# Patient Record
Sex: MALE | Race: WHITE | URBAN - METROPOLITAN AREA
[De-identification: names, ages, dates, MRNs, and addresses within clinical notes are randomized per-mention and may not be internally consistent; named-entity substitution may affect disease eponyms.]

---

## 2017-11-17 RX ORDER — CYCLOBENZAPRINE HCL 5 MG
5 TABLET ORAL 3 TIMES DAILY PRN
COMMUNITY

## 2017-11-20 ENCOUNTER — APPOINTMENT (OUTPATIENT)
Dept: GENERAL RADIOLOGY | Facility: CLINIC | Age: 62
End: 2017-11-20
Attending: SPECIALIST
Payer: COMMERCIAL

## 2017-11-20 ENCOUNTER — ANESTHESIA EVENT (OUTPATIENT)
Dept: SURGERY | Facility: CLINIC | Age: 62
End: 2017-11-20
Payer: COMMERCIAL

## 2017-11-20 ENCOUNTER — ANESTHESIA (OUTPATIENT)
Dept: SURGERY | Facility: CLINIC | Age: 62
End: 2017-11-20
Payer: COMMERCIAL

## 2017-11-20 ENCOUNTER — HOSPITAL ENCOUNTER (OUTPATIENT)
Facility: CLINIC | Age: 62
Discharge: HOME | End: 2017-11-20
Attending: SPECIALIST | Admitting: SPECIALIST
Payer: COMMERCIAL

## 2017-11-20 ENCOUNTER — HOSPITAL ENCOUNTER (OUTPATIENT)
Dept: NUCLEAR MEDICINE | Facility: CLINIC | Age: 62
Setting detail: NUCLEAR MEDICINE
End: 2017-11-20
Attending: SPECIALIST
Payer: COMMERCIAL

## 2017-11-20 VITALS
HEIGHT: 69 IN | RESPIRATION RATE: 16 BRPM | SYSTOLIC BLOOD PRESSURE: 121 MMHG | TEMPERATURE: 97.8 F | BODY MASS INDEX: 26.07 KG/M2 | WEIGHT: 176 LBS | DIASTOLIC BLOOD PRESSURE: 63 MMHG | OXYGEN SATURATION: 97 %

## 2017-11-20 DIAGNOSIS — C79.40: Primary | ICD-10-CM

## 2017-11-20 DIAGNOSIS — C61: Primary | ICD-10-CM

## 2017-11-20 DIAGNOSIS — C61 PROSTATE CANCER (H): ICD-10-CM

## 2017-11-20 PROCEDURE — 40000277 XR SURGERY CARM FLUORO LESS THAN 5 MIN W STILLS

## 2017-11-20 PROCEDURE — 36000052 ZZH SURGERY LEVEL 2 EA 15 ADDTL MIN: Performed by: SPECIALIST

## 2017-11-20 PROCEDURE — 25000128 H RX IP 250 OP 636: Performed by: ANESTHESIOLOGY

## 2017-11-20 PROCEDURE — 25000128 H RX IP 250 OP 636: Performed by: SPECIALIST

## 2017-11-20 PROCEDURE — 71000027 ZZH RECOVERY PHASE 2 EACH 15 MINS: Performed by: SPECIALIST

## 2017-11-20 PROCEDURE — C1728 CATH, BRACHYTX SEED ADM: HCPCS | Performed by: SPECIALIST

## 2017-11-20 PROCEDURE — 25500064 ZZH RX 255 OP 636: Performed by: SPECIALIST

## 2017-11-20 PROCEDURE — 76965 ECHO GUIDANCE RADIOTHERAPY: CPT

## 2017-11-20 PROCEDURE — 71000012 ZZH RECOVERY PHASE 1 LEVEL 1 FIRST HR: Performed by: SPECIALIST

## 2017-11-20 PROCEDURE — 37000009 ZZH ANESTHESIA TECHNICAL FEE, EACH ADDTL 15 MIN: Performed by: SPECIALIST

## 2017-11-20 PROCEDURE — 40000170 ZZH STATISTIC PRE-PROCEDURE ASSESSMENT II: Performed by: SPECIALIST

## 2017-11-20 PROCEDURE — 27210794 ZZH OR GENERAL SUPPLY STERILE: Performed by: SPECIALIST

## 2017-11-20 PROCEDURE — 36000054 ZZH SURGERY LEVEL 2 W FLUORO 1ST 30 MIN: Performed by: SPECIALIST

## 2017-11-20 PROCEDURE — 25000125 ZZHC RX 250: Performed by: NURSE ANESTHETIST, CERTIFIED REGISTERED

## 2017-11-20 PROCEDURE — 37000008 ZZH ANESTHESIA TECHNICAL FEE, 1ST 30 MIN: Performed by: SPECIALIST

## 2017-11-20 PROCEDURE — 76499 UNLISTED DX RADIOGRAPHIC PX: CPT

## 2017-11-20 PROCEDURE — 25000566 ZZH SEVOFLURANE, EA 15 MIN: Performed by: SPECIALIST

## 2017-11-20 PROCEDURE — 25000128 H RX IP 250 OP 636: Performed by: NURSE ANESTHETIST, CERTIFIED REGISTERED

## 2017-11-20 PROCEDURE — 25000132 ZZH RX MED GY IP 250 OP 250 PS 637: Performed by: SPECIALIST

## 2017-11-20 RX ORDER — FENTANYL CITRATE 50 UG/ML
25-50 INJECTION, SOLUTION INTRAMUSCULAR; INTRAVENOUS EVERY 5 MIN PRN
Status: DISCONTINUED | OUTPATIENT
Start: 2017-11-20 | End: 2017-11-20 | Stop reason: HOSPADM

## 2017-11-20 RX ORDER — ONDANSETRON 2 MG/ML
INJECTION INTRAMUSCULAR; INTRAVENOUS PRN
Status: DISCONTINUED | OUTPATIENT
Start: 2017-11-20 | End: 2017-11-20

## 2017-11-20 RX ORDER — PROPOFOL 10 MG/ML
INJECTION, EMULSION INTRAVENOUS CONTINUOUS PRN
Status: DISCONTINUED | OUTPATIENT
Start: 2017-11-20 | End: 2017-11-20

## 2017-11-20 RX ORDER — HYDROCODONE BITARTRATE AND ACETAMINOPHEN 5; 325 MG/1; MG/1
1 TABLET ORAL EVERY 4 HOURS PRN
Qty: 20 TABLET | Refills: 0 | Status: SHIPPED | OUTPATIENT
Start: 2017-11-20

## 2017-11-20 RX ORDER — FENTANYL CITRATE 50 UG/ML
INJECTION, SOLUTION INTRAMUSCULAR; INTRAVENOUS PRN
Status: DISCONTINUED | OUTPATIENT
Start: 2017-11-20 | End: 2017-11-20

## 2017-11-20 RX ORDER — PROPOFOL 10 MG/ML
INJECTION, EMULSION INTRAVENOUS PRN
Status: DISCONTINUED | OUTPATIENT
Start: 2017-11-20 | End: 2017-11-20

## 2017-11-20 RX ORDER — DEXAMETHASONE SODIUM PHOSPHATE 4 MG/ML
INJECTION, SOLUTION INTRA-ARTICULAR; INTRALESIONAL; INTRAMUSCULAR; INTRAVENOUS; SOFT TISSUE PRN
Status: DISCONTINUED | OUTPATIENT
Start: 2017-11-20 | End: 2017-11-20

## 2017-11-20 RX ORDER — MEPERIDINE HYDROCHLORIDE 25 MG/ML
12.5 INJECTION INTRAMUSCULAR; INTRAVENOUS; SUBCUTANEOUS
Status: DISCONTINUED | OUTPATIENT
Start: 2017-11-20 | End: 2017-11-20 | Stop reason: HOSPADM

## 2017-11-20 RX ORDER — CEFAZOLIN SODIUM 2 G/100ML
2 INJECTION, SOLUTION INTRAVENOUS
Status: COMPLETED | OUTPATIENT
Start: 2017-11-20 | End: 2017-11-20

## 2017-11-20 RX ORDER — SODIUM CHLORIDE, SODIUM LACTATE, POTASSIUM CHLORIDE, CALCIUM CHLORIDE 600; 310; 30; 20 MG/100ML; MG/100ML; MG/100ML; MG/100ML
INJECTION, SOLUTION INTRAVENOUS CONTINUOUS
Status: DISCONTINUED | OUTPATIENT
Start: 2017-11-20 | End: 2017-11-20 | Stop reason: HOSPADM

## 2017-11-20 RX ORDER — NALOXONE HYDROCHLORIDE 0.4 MG/ML
.1-.4 INJECTION, SOLUTION INTRAMUSCULAR; INTRAVENOUS; SUBCUTANEOUS
Status: DISCONTINUED | OUTPATIENT
Start: 2017-11-20 | End: 2017-11-20 | Stop reason: HOSPADM

## 2017-11-20 RX ORDER — EPHEDRINE SULFATE 50 MG/ML
INJECTION, SOLUTION INTRAMUSCULAR; INTRAVENOUS; SUBCUTANEOUS PRN
Status: DISCONTINUED | OUTPATIENT
Start: 2017-11-20 | End: 2017-11-20

## 2017-11-20 RX ORDER — HYDROCODONE BITARTRATE AND ACETAMINOPHEN 5; 325 MG/1; MG/1
1 TABLET ORAL EVERY 6 HOURS PRN
Status: DISCONTINUED | OUTPATIENT
Start: 2017-11-20 | End: 2017-11-20 | Stop reason: HOSPADM

## 2017-11-20 RX ORDER — LIDOCAINE HYDROCHLORIDE 20 MG/ML
INJECTION, SOLUTION INFILTRATION; PERINEURAL PRN
Status: DISCONTINUED | OUTPATIENT
Start: 2017-11-20 | End: 2017-11-20

## 2017-11-20 RX ORDER — ONDANSETRON 2 MG/ML
4 INJECTION INTRAMUSCULAR; INTRAVENOUS EVERY 30 MIN PRN
Status: DISCONTINUED | OUTPATIENT
Start: 2017-11-20 | End: 2017-11-20 | Stop reason: HOSPADM

## 2017-11-20 RX ORDER — HYDROCODONE BITARTRATE AND ACETAMINOPHEN 5; 325 MG/1; MG/1
1-2 TABLET ORAL ONCE
Status: COMPLETED | OUTPATIENT
Start: 2017-11-20 | End: 2017-11-20

## 2017-11-20 RX ORDER — FENTANYL CITRATE 50 UG/ML
25-50 INJECTION, SOLUTION INTRAMUSCULAR; INTRAVENOUS
Status: DISCONTINUED | OUTPATIENT
Start: 2017-11-20 | End: 2017-11-20 | Stop reason: HOSPADM

## 2017-11-20 RX ORDER — ONDANSETRON 4 MG/1
4 TABLET, ORALLY DISINTEGRATING ORAL EVERY 30 MIN PRN
Status: DISCONTINUED | OUTPATIENT
Start: 2017-11-20 | End: 2017-11-20 | Stop reason: HOSPADM

## 2017-11-20 RX ORDER — SODIUM CHLORIDE, SODIUM LACTATE, POTASSIUM CHLORIDE, CALCIUM CHLORIDE 600; 310; 30; 20 MG/100ML; MG/100ML; MG/100ML; MG/100ML
INJECTION, SOLUTION INTRAVENOUS CONTINUOUS PRN
Status: DISCONTINUED | OUTPATIENT
Start: 2017-11-20 | End: 2017-11-20

## 2017-11-20 RX ORDER — GLYCOPYRROLATE 0.2 MG/ML
INJECTION, SOLUTION INTRAMUSCULAR; INTRAVENOUS PRN
Status: DISCONTINUED | OUTPATIENT
Start: 2017-11-20 | End: 2017-11-20

## 2017-11-20 RX ORDER — HYDROMORPHONE HYDROCHLORIDE 1 MG/ML
.3-.5 INJECTION, SOLUTION INTRAMUSCULAR; INTRAVENOUS; SUBCUTANEOUS EVERY 10 MIN PRN
Status: DISCONTINUED | OUTPATIENT
Start: 2017-11-20 | End: 2017-11-20 | Stop reason: HOSPADM

## 2017-11-20 RX ORDER — CIPROFLOXACIN 500 MG/1
500 TABLET, FILM COATED ORAL 2 TIMES DAILY
Qty: 14 TABLET | Refills: 0 | Status: SHIPPED | OUTPATIENT
Start: 2017-11-20

## 2017-11-20 RX ADMIN — DEXAMETHASONE SODIUM PHOSPHATE 4 MG: 4 INJECTION, SOLUTION INTRA-ARTICULAR; INTRALESIONAL; INTRAMUSCULAR; INTRAVENOUS; SOFT TISSUE at 12:04

## 2017-11-20 RX ADMIN — FENTANYL CITRATE 50 MCG: 50 INJECTION INTRAMUSCULAR; INTRAVENOUS at 13:46

## 2017-11-20 RX ADMIN — Medication 10 MG: at 11:55

## 2017-11-20 RX ADMIN — PHENYLEPHRINE HYDROCHLORIDE 100 MCG: 10 INJECTION INTRAVENOUS at 12:42

## 2017-11-20 RX ADMIN — DEXMEDETOMIDINE HYDROCHLORIDE 8 MCG: 100 INJECTION, SOLUTION INTRAVENOUS at 12:23

## 2017-11-20 RX ADMIN — PHENYLEPHRINE HYDROCHLORIDE 100 MCG: 10 INJECTION INTRAVENOUS at 12:30

## 2017-11-20 RX ADMIN — FENTANYL CITRATE 50 MCG: 50 INJECTION, SOLUTION INTRAMUSCULAR; INTRAVENOUS at 11:49

## 2017-11-20 RX ADMIN — LIDOCAINE HYDROCHLORIDE 80 MG: 20 INJECTION, SOLUTION INFILTRATION; PERINEURAL at 11:49

## 2017-11-20 RX ADMIN — ONDANSETRON 4 MG: 2 INJECTION INTRAMUSCULAR; INTRAVENOUS at 12:04

## 2017-11-20 RX ADMIN — PROPOFOL 40 MG: 10 INJECTION, EMULSION INTRAVENOUS at 12:21

## 2017-11-20 RX ADMIN — CEFAZOLIN SODIUM 2 G: 2 INJECTION, SOLUTION INTRAVENOUS at 12:00

## 2017-11-20 RX ADMIN — PROPOFOL 100 MCG/KG/MIN: 10 INJECTION, EMULSION INTRAVENOUS at 11:51

## 2017-11-20 RX ADMIN — SODIUM CHLORIDE, POTASSIUM CHLORIDE, SODIUM LACTATE AND CALCIUM CHLORIDE: 600; 310; 30; 20 INJECTION, SOLUTION INTRAVENOUS at 12:38

## 2017-11-20 RX ADMIN — PROPOFOL 160 MG: 10 INJECTION, EMULSION INTRAVENOUS at 11:49

## 2017-11-20 RX ADMIN — GLYCOPYRROLATE 0.2 MG: 0.2 INJECTION, SOLUTION INTRAMUSCULAR; INTRAVENOUS at 12:16

## 2017-11-20 RX ADMIN — DEXMEDETOMIDINE HYDROCHLORIDE 4 MCG: 100 INJECTION, SOLUTION INTRAVENOUS at 12:24

## 2017-11-20 RX ADMIN — FENTANYL CITRATE 50 MCG: 50 INJECTION, SOLUTION INTRAMUSCULAR; INTRAVENOUS at 12:14

## 2017-11-20 RX ADMIN — MIDAZOLAM HYDROCHLORIDE 2 MG: 1 INJECTION, SOLUTION INTRAMUSCULAR; INTRAVENOUS at 11:47

## 2017-11-20 RX ADMIN — SODIUM CHLORIDE, POTASSIUM CHLORIDE, SODIUM LACTATE AND CALCIUM CHLORIDE: 600; 310; 30; 20 INJECTION, SOLUTION INTRAVENOUS at 11:45

## 2017-11-20 RX ADMIN — Medication 10 MG: at 12:15

## 2017-11-20 RX ADMIN — FENTANYL CITRATE 50 MCG: 50 INJECTION INTRAMUSCULAR; INTRAVENOUS at 13:41

## 2017-11-20 RX ADMIN — HYDROCODONE BITARTRATE AND ACETAMINOPHEN 1 TABLET: 5; 325 TABLET ORAL at 14:13

## 2017-11-20 RX ADMIN — Medication 5 MG: at 12:30

## 2017-11-20 NOTE — OP NOTE
DATE OF SERVICE:  2017      PROCEDURE:  Placement of radioactive iodine-125 seeds for ftreatment of prostate cancer.      PREOPERATIVE DIAGNOSIS:   Prostate cancer.      POSTOPERATIVE DIAGNOSIS:  Prostate cancer.      DESCRIPTION OF PROCEDURE:  Mr. Douglas Miller was prepped and draped in the usual fashion in the dorsal lithotomy position.  Examination under anesthesia by myself revealed no abnormalities.  I placed the transrectal ultrasound with care in an attempt to replicate the patient's set-up during his preoperative volume study.  I then performed an ultrasound volume study in the operating room and contoured the patient's prostate on the new volume study.  These new images were fused with the preplanned images and used to create a real time isodose plan in the operating room.  The radioactive iodine-125 seeds were then implanted into the prostate by Dr. Cee while I viewed and directed placement under transrectal ultrasound and fluoroscopy.  A total of 89 radioactive iodine seeds were inserted.  Each seed had an activity of 0.429 units, for a total activity of 38.09 units.  Four needles containing 3 seeds per needle, 13 needles containing 4 seeds per needle, and 5 needles containing 5 seeds per needle were used for the procedure.  The Whitaker catheter was thereafter removed without incident, with no signs of hematuria.  The patient tolerated the procedure well and left the operating room in satisfactory condition.         STUART ANN MD             D: 2017 14:11   T: 2017 16:21   MT: [id number]      Name:     DOUGLAS MILELR   MRN:      [id number]        Account:        [id number]   :      1955           Procedure Date: 2017      Document: [id number]

## 2017-11-20 NOTE — ANESTHESIA CARE TRANSFER NOTE
Patient: Kadeem Miller    Procedure(s):  PROSTATE BRACHI THERAPY  - Wound Class: I-Clean    Diagnosis: PROSTATE CANCER   Diagnosis Additional Information: No value filed.    Anesthesia Type:   General, LMA     Note:  Airway :Face Mask  Patient transferred to:PACU  Comments: Pt to PACU. VSS. Report complete to RNHandoff Report: Identifed the Patient, Identified the Reponsible Provider, Reviewed the pertinent medical history, Discussed the surgical course, Reviewed Intra-OP anesthesia mangement and issues during anesthesia, Set expectations for post-procedure period and Allowed opportunity for questions and acknowledgement of understanding      Vitals: (Last set prior to Anesthesia Care Transfer)    CRNA VITALS  11/20/2017 1252 - 11/20/2017 1326      11/20/2017             Pulse: 85    SpO2: (!)  81 %    Resp Rate (observed): (!)  7                Electronically Signed By: Daxa Hamilton CRNA, APRN CRNA  November 20, 2017  1:26 PM

## 2017-11-20 NOTE — ANESTHESIA PREPROCEDURE EVALUATION
Anesthesia Evaluation     .             ROS/MED HX    ENT/Pulmonary:      (-) sleep apnea   Neurologic:       Cardiovascular:         METS/Exercise Tolerance:     Hematologic:         Musculoskeletal:         GI/Hepatic:        (-) GERD   Renal/Genitourinary:     (+) Nephrolithiasis ,       Endo:         Psychiatric: Comment: PTSD;    (+) psychiatric history anxiety      Infectious Disease:         Malignancy:   (+) Malignancy History of Prostate          Other: Comment: Occasional marijuana use;                    Physical Exam  Normal systems: cardiovascular, pulmonary and dental    Airway   Mallampati: II  TM distance: >3 FB  Neck ROM: full    Dental     Cardiovascular       Pulmonary                     Anesthesia Plan      History & Physical Review  History and physical reviewed and following examination; no interval change.    ASA Status:  2 .    NPO Status:  > 8 hours    Plan for General and LMA with Intravenous induction. Maintenance will be Balanced.    PONV prophylaxis:  Ondansetron (or other 5HT-3)       Postoperative Care  Postoperative pain management:  IV analgesics.      Consents  Anesthetic plan, risks, benefits and alternatives discussed with:  Patient..                          .

## 2017-11-20 NOTE — OP NOTE
DATE OF PROCEDURE:  2017      PROCEDURE:  Prostate brachytherapy.      ANESTHESIA:  General.      PREOPERATIVE DIAGNOSIS:  Localized prostate cancer.      POSTOPERATIVE DIAGNOSIS:  Localized prostate cancer.      SURGEONS:  Anselmo Cee MD and Marky Ryder MD      DESCRIPTION OF PROCEDURE:  Douglas Miller was brought into the operating room and given a general anesthetic.  He was then placed into the lithotomy position.  After adequate preparation and draping and a pause for the cause, a Whitaker catheter was placed.  Using a transrectal ultrasound and a C-arm, we were able to place 89 seeds of iodine-125 transperineally.  He had good coverage.  The catheter was removed.  The patient was then reversed from anesthesia and discharged to recovery room in satisfactory condition.         ANSELMO CEE MD             D: 2017 13:12   T: 2017 15:23   MT: [id number]      Name:     DOUGLAS MILLER   MRN:      [id number]        Account:        [id number]   :      1955           Procedure Date: 2017      Document: [id number]       cc: Marky Cee MD

## 2017-11-20 NOTE — IP AVS SNAPSHOT
[facility]    [address]    [place] [address]    Phone:  [phone]    Fax:  [phone]                                       After Visit Summary   11/20/2017    Kadeem Miller    MRN: [id number]           After Visit Summary Signature Page     I have received my discharge instructions, and my questions have been answered. I have discussed any challenges I see with this plan with the nurse or doctor.    ..........................................................................................................................................  Patient/Patient Representative Signature      ..........................................................................................................................................  Patient Representative Print Name and Relationship to Patient    ..................................................               ................................................  Date                                            Time    ..........................................................................................................................................  Reviewed by Signature/Title    ...................................................              ..............................................  Date                                                            Time

## 2017-11-20 NOTE — IP AVS SNAPSHOT
MRN:[id number]                      After Visit Summary   11/20/2017    Kadeem Miller    MRN: [id number]           Thank you!     Thank you for choosing [facility] for your care. Our goal is always to provide you with excellent care. Hearing back from our patients is one way we can continue to improve our services. Please take a few minutes to complete the written survey that you may receive in the mail after you visit with us. Thank you!        Patient Information     Date Of Birth          1955        About your hospital stay     You were admitted on:  November 20, 2017 You last received care in the:  [facility]    You were discharged on:  November 20, 2017       Who to Call     For medical emergencies, please call 911.  For non-urgent questions about your medical care, please call your primary care provider or clinic, [phone]  For questions related to your surgery, please call your surgery clinic        Attending Provider     Provider Specialty    Anselmo Cee MD Urology       Primary Care Provider Office Phone # Fax #    Grabielsohail MD [place] [phone]      After Care Instructions     Discharge Instructions       Strain Urine and save seeds in provided container            Discharge Instructions       Radiation Oncologist Appointment in 2-3 weeks            Discharge Instructions       Urologist Appointment in 3-4 weeks            Discharge Instructions       No driving or operating machinery until the day after procedure            Discharge Instructions       Resume pre procedure diet                  Further instructions from your care team       Same Day Surgery Discharge Instructions for  Sedation and General Anesthesia       It's not unusual to feel dizzy, light-headed or faint for up to 24 hours after surgery or while taking pain medication.  If you have these symptoms: sit for a few minutes before standing and have someone assist  you when you get up to walk or use the bathroom.      You should rest and relax for the next 24 hours. We recommend you make arrangements to have an adult stay with you for at least 24 hours after your discharge.  Avoid hazardous and strenuous activity.      DO NOT DRIVE any vehicle or operate mechanical equipment for 24 hours following the end of your surgery.  Even though you may feel normal, your reactions may be affected by the medication you have received.      Do not drink alcoholic beverages for 24 hours following surgery.       Slowly progress to your regular diet as you feel able. It's not unusual to feel nauseated and/or vomit after receiving anesthesia.  If you develop these symptoms, drink clear liquids (apple juice, ginger ale, broth, 7-up, etc. ) until you feel better.  If your nausea and vomiting persists for 24 hours, please notify your surgeon.        All narcotic pain medications, along with inactivity and anesthesia, can cause constipation. Drinking plenty of liquids and increasing fiber intake will help.      For any questions of a medical nature, call your surgeon.      Do not make important decisions for 24 hours.      If you had general anesthesia, you may have a sore throat for a couple of days related to the breathing tube used during surgery.  You may use Cepacol lozenges to help with this discomfort.  If it worsens or if you develop a fever, contact your surgeon.       If you feel your pain is not well managed with the pain medications prescribed by your surgeon, please contact your surgeon's office to let them know so they can address your concerns.       [facility]   Instructions for Patients Receiving  Radioactive Seed Implants for Prostate Cancer  [facility]             Radiation Precautions    Radiation safety is a concern.  Iodine-125 and Palladium-103 are low energy radioactive materials that quickly lose their radioactivity.      The low  energy of the seeds means that most of the radiation is contained within the prostate gland.  Some radiation is given off to the surrounding tissues such as the bladder and rectum.     Because there is a small amount of radiation that escapes the body, you should observe the following precautions to ensure that those around you are protected from unnecessary radiation.     Any children under the age of 18 years and pregnant women should avoid close, prolonged, personal contact with you for at least 4 weeks following the implant.  They should not sit next to you; they should not sit on your lap.  They can greet you briefly, hug you, and then move to a distance of at least 6 feet.  At this distance, there is no limit to the length of time they can be in the same room with you.  NOTE:  Objects that you touch DO NOT become radioactive.    Sexual Apex    Sexual intercourse may be resumed following the implant.     You should use a condom during intercourse for 2 weeks following the implant.     You may experience slight pain with ejaculation the first few times your have intercourse.      You may notice blood in the semen, this is not harmful.                Urination    Blood in the urine or pink-colored urine is common for a few days following the implant.   Increase your fluid intake to flush any blood from the urine.     You may experience urinary frequency (especially during the night) and a burning sensation during urination.  These symptoms usually subside within 2-3 weeks following the implant.    On occasion, you will note difficulty urinating following the implant procedure.  If you are unable to urinate, you will need to notify your urologist, as you may need to have a urinary catheter placed.     If you have a catheter placed, you will need to ask for a catheter clamp.  The catheter should be clamped until you feel the urge to urinate.  When you feel the urge to urinate, remove the clamp so the urine  can drain.  When the bladder is empty (catheter has stopped draining urine), re-clamp the catheter.  Continue this regimen until the catheter is removed.                 Bowels     It is not uncommon for you to go without a bowel movement for the first day or two after the implant.      You do not need to use a laxative--bowel function will return on its own.  Rarely, you will note more frequent bowel movements and a looser consistency to the stool.     Some bruising, discoloration, swelling of the testicles or implant area may occur.   An ice pack may help to decrease the discomfort.                       Long-Term Side Effects      Urination:  You may continue to have ongoing problems with urination.  Please notify the Radiation Oncologist.  The Radiation Oncologist may refer you to your urologist.      If the urologist recommends a urinary procedure to help the symptoms  (i.e. TURP), remember: NO PROCEDURE SHOULD BE PERFORMED WITHOUT PRIOR NOTIFICATION OF THE RADIATION ONCOLOGIST.    Bowels:  Very rarely, you may have on-going problems with bowel irritation.  Please notify the Radiation Oncologist.      The Radiation Oncologist may refer you to a colon-rectal specialist for evaluation.  It is important to remember:  NO BIOPSIES OF THE RECTUM OR COLON SHOULD BE PERFORMED WITHOUT PRIOR NOTIFICATION OF THE RADIATION ONCOLOGIST.      Please call if you have any questions.  Our phone number is__________________________      ________________________________  ________________________  Patient Signature     Nurse Signature       ________________________________         Date/Time            **If you have questions or concerns about your procedure,                        call Dr. Cee at [phone]**            Pending Results     Date and Time Order Name Status Description    11/20/2017 1313 XR Surgery ALCIDES Fluoro L/T 5 Min w Stills In process             Admission Information     Date & Time Provider Department Dept.  "Phone    11/20/2017 Anselmo Cee MD [facility] [phone]      Your Vitals Were     Blood Pressure Temperature Respirations Height Weight Pulse Oximetry    128/76 97.8  F (36.6  C) (Temporal) 11 1.753 m (5' 9\") 79.8 kg (176 lb) 97%    BMI (Body Mass Index)                   25.99 kg/m2           [facility] Information     [facility] gives you secure access to your electronic health record. If you see a primary care provider, you can also send messages to your care team and make appointments. If you have questions, please call your primary care clinic.  If you do not have a primary care provider, please call [phone] and they will assist you.        Care EveryWhere ID     This is your Care EveryWhere ID. This could be used by other organizations to access your [facility] medical records  [id number]        Equal Access to Services     MATTY WILSON : Hortencia Miller, janell drew, bereket baltazar, mikaela harmon . So [facility] [phone].    ATENCIÓN: Si habla español, tiene a dudley disposición servicios gratuitos de asistencia lingüística. Llame al [phone].    We comply with applicable federal civil rights laws and [place] laws. We do not discriminate on the basis of race, color, national origin, age, disability, sex, sexual orientation, or gender identity.               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)   Notes to Patient:  One norco pain pill given at 2:13 PM        Dose:  1 tablet   Take 1 " tablet by mouth every 4 hours as needed for pain   Quantity:  20 tablet   Refills:  0         CONTINUE these medicines which have NOT CHANGED        Dose / Directions    cyclobenzaprine 5 MG tablet   Commonly known as:  FLEXERIL        Dose:  5 mg   Take 5 mg by mouth 3 times daily as needed for muscle spasms   Refills:  0       LEXAPRO PO        Dose:  10 mg   Take 10 mg by mouth daily   Refills:  0       VALTREX PO        Dose:  500 mg   Take 500 mg by mouth daily   Refills:  0       XANAX PO        Dose:  0.25 mg   Take 0.25 mg by mouth 4 times daily as needed for anxiety   Refills:  0            Where to get your medicines      These medications were sent to [facility] [place], [place] - [address]  [address], [place] [address]     Phone:  [phone]     ciprofloxacin 500 MG tablet         Some of these will need a paper prescription and others can be bought over the counter. Ask your nurse if you have questions.     Bring a paper prescription for each of these medications     HYDROcodone-acetaminophen 5-325 MG per tablet               ANTIBIOTIC INSTRUCTION     You've Been Prescribed an Antibiotic - Now What?  Your healthcare team thinks that you or your loved one might have an infection. Some infections can be treated with antibiotics, which are powerful, life-saving drugs. Like all medications, antibiotics have side effects and should only be used when necessary. There are some important things you should know about your antibiotic treatment.      Your healthcare team may run tests before you start taking an antibiotic.    Your team may take samples (e.g., from your blood, urine or other areas) to run tests to look for bacteria. These test can be important to determine if you need an antibiotic at all and, if you do, which antibiotic will work best.      Within a few days, your healthcare team might change or even stop your antibiotic.    Your team may start you on an  antibiotic while they are working to find out what is making you sick.    Your team might change your antibiotic because test results show that a different antibiotic would be better to treat your infection.    In some cases, once your team has more information, they learn that you do not need an antibiotic at all. They may find out that you don't have an infection, or that the antibiotic you're taking won't work against your infection. For example, an infection caused by a virus can't be treated with antibiotics. Staying on an antibiotic when you don't need it is more likely to be harmful than helpful.      You may experience side effects from your antibiotic.    Like all medications, antibiotics have side effects. Some of these can be serious.    Let you healthcare team know if you have any known allergies when you are admitted to the hospital.    One significant side effect of nearly all antibiotics is the risk of severe and sometimes deadly diarrhea caused by Clostridium difficile (C. Difficile). This occurs when a person takes antibiotics because some good germs are destroyed. Antibiotic use allows C. diificile to take over, putting patients at high risk for this serious infection.    As a patient or caregiver, it is important to understand your or your loved one's antibiotic treatment. It is especially important for caregivers to speak up when patients can't speak for themselves. Here are some important questions to ask your healthcare team.    What infection is this antibiotic treating and how do you know I have that infection?    What side effects might occur from this antibiotic?    How long will I need to take this antibiotic?    Is it safe to take this antibiotic with other medications or supplements (e.g., vitamins) that I am taking?     Are there any special directions I need to know about taking this antibiotic? For example, should I take it with food?    How will I be monitored to know whether my  infection is responding to the antibiotic?    What tests may help to make sure the right antibiotic is prescribed for me?      Information provided by:  www.cdc.gov/getsmart  U.S. Department of Health and Human Services  Centers for disease Control and Prevention  National Center for Emerging and Zoonotic Infectious Diseases  Division of Healthcare Quality Promotion         Protect others around you: Learn how to safely use, store and throw away your medicines at www.disposemymeds.org.             Medication List: This is a list of all your medications and when to take them. Check marks below indicate your daily home schedule. Keep this list as a reference.      Medications           Morning Afternoon Evening Bedtime As Needed    ciprofloxacin 500 MG tablet   Commonly known as:  CIPRO   Take 1 tablet (500 mg) by mouth 2 times daily                                cyclobenzaprine 5 MG tablet   Commonly known as:  FLEXERIL   Take 5 mg by mouth 3 times daily as needed for muscle spasms                                HYDROcodone-acetaminophen 5-325 MG per tablet   Commonly known as:  NORCO   Take 1 tablet by mouth every 4 hours as needed for pain   Last time this was given:  1 tablet on 11/20/2017  2:13 PM   Notes to Patient:  One norco pain pill given at 2:13 PM                                LEXAPRO PO   Take 10 mg by mouth daily                                VALTREX PO   Take 500 mg by mouth daily                                XANAX PO   Take 0.25 mg by mouth 4 times daily as needed for anxiety

## 2017-11-20 NOTE — ANESTHESIA POSTPROCEDURE EVALUATION
Patient: Kadeem Miller    Procedure(s):  PROSTATE BRACHI THERAPY  - Wound Class: I-Clean    Diagnosis:PROSTATE CANCER   Diagnosis Additional Information: No value filed.    Anesthesia Type:  General, LMA    Note:  Anesthesia Post Evaluation    Patient location during evaluation: PACU  Patient participation: Able to fully participate in evaluation  Level of consciousness: awake  Pain management: adequate  Airway patency: patent  Cardiovascular status: acceptable  Respiratory status: acceptable  Hydration status: acceptable  PONV: none     Anesthetic complications: None          Last vitals:  Vitals:    11/20/17 1345 11/20/17 1400 11/20/17 1415   BP: 127/71 134/82 128/76   Resp: 11 13 11   Temp: 35.5  C (95.9  F) 35.7  C (96.3  F) 36.6  C (97.8  F)   SpO2: 95% 96% 97%         Electronically Signed By: ABBY GREGORY MD  November 20, 2017  2:56 PM

## 2017-11-20 NOTE — DISCHARGE INSTRUCTIONS
Same Day Surgery Discharge Instructions for  Sedation and General Anesthesia       It's not unusual to feel dizzy, light-headed or faint for up to 24 hours after surgery or while taking pain medication.  If you have these symptoms: sit for a few minutes before standing and have someone assist you when you get up to walk or use the bathroom.      You should rest and relax for the next 24 hours. We recommend you make arrangements to have an adult stay with you for at least 24 hours after your discharge.  Avoid hazardous and strenuous activity.      DO NOT DRIVE any vehicle or operate mechanical equipment for 24 hours following the end of your surgery.  Even though you may feel normal, your reactions may be affected by the medication you have received.      Do not drink alcoholic beverages for 24 hours following surgery.       Slowly progress to your regular diet as you feel able. It's not unusual to feel nauseated and/or vomit after receiving anesthesia.  If you develop these symptoms, drink clear liquids (apple juice, ginger ale, broth, 7-up, etc. ) until you feel better.  If your nausea and vomiting persists for 24 hours, please notify your surgeon.        All narcotic pain medications, along with inactivity and anesthesia, can cause constipation. Drinking plenty of liquids and increasing fiber intake will help.      For any questions of a medical nature, call your surgeon.      Do not make important decisions for 24 hours.      If you had general anesthesia, you may have a sore throat for a couple of days related to the breathing tube used during surgery.  You may use Cepacol lozenges to help with this discomfort.  If it worsens or if you develop a fever, contact your surgeon.       If you feel your pain is not well managed with the pain medications prescribed by your surgeon, please contact your surgeon's office to let them know so they can address your concerns.       [facility]   Instructions  for Patients Receiving  Radioactive Seed Implants for Prostate Cancer  [facility]             Radiation Precautions    Radiation safety is a concern.  Iodine-125 and Palladium-103 are low energy radioactive materials that quickly lose their radioactivity.      The low energy of the seeds means that most of the radiation is contained within the prostate gland.  Some radiation is given off to the surrounding tissues such as the bladder and rectum.     Because there is a small amount of radiation that escapes the body, you should observe the following precautions to ensure that those around you are protected from unnecessary radiation.     Any children under the age of 18 years and pregnant women should avoid close, prolonged, personal contact with you for at least 4 weeks following the implant.  They should not sit next to you; they should not sit on your lap.  They can greet you briefly, hug you, and then move to a distance of at least 6 feet.  At this distance, there is no limit to the length of time they can be in the same room with you.  NOTE:  Objects that you touch DO NOT become radioactive.    Sexual [place]    Sexual intercourse may be resumed following the implant.     You should use a condom during intercourse for 2 weeks following the implant.     You may experience slight pain with ejaculation the first few times your have intercourse.      You may notice blood in the semen, this is not harmful.                Urination    Blood in the urine or pink-colored urine is common for a few days following the implant.   Increase your fluid intake to flush any blood from the urine.     You may experience urinary frequency (especially during the night) and a burning sensation during urination.  These symptoms usually subside within 2-3 weeks following the implant.    On occasion, you will note difficulty urinating following the implant procedure.  If you are unable to urinate, you will  need to notify your urologist, as you may need to have a urinary catheter placed.     If you have a catheter placed, you will need to ask for a catheter clamp.  The catheter should be clamped until you feel the urge to urinate.  When you feel the urge to urinate, remove the clamp so the urine can drain.  When the bladder is empty (catheter has stopped draining urine), re-clamp the catheter.  Continue this regimen until the catheter is removed.                 Bowels     It is not uncommon for you to go without a bowel movement for the first day or two after the implant.      You do not need to use a laxative--bowel function will return on its own.  Rarely, you will note more frequent bowel movements and a looser consistency to the stool.     Some bruising, discoloration, swelling of the testicles or implant area may occur.   An ice pack may help to decrease the discomfort.                       Long-Term Side Effects      Urination:  You may continue to have ongoing problems with urination.  Please notify the Radiation Oncologist.  The Radiation Oncologist may refer you to your urologist.      If the urologist recommends a urinary procedure to help the symptoms  (i.e. TURP), remember: NO PROCEDURE SHOULD BE PERFORMED WITHOUT PRIOR NOTIFICATION OF THE RADIATION ONCOLOGIST.    Bowels:  Very rarely, you may have on-going problems with bowel irritation.  Please notify the Radiation Oncologist.      The Radiation Oncologist may refer you to a colon-rectal specialist for evaluation.  It is important to remember:  NO BIOPSIES OF THE RECTUM OR COLON SHOULD BE PERFORMED WITHOUT PRIOR NOTIFICATION OF THE RADIATION ONCOLOGIST.      Please call if you have any questions.  Our phone number is__________________________      ________________________________  ________________________  Patient Signature     Nurse Signature       ________________________________         Date/Time            **If you have questions or concerns about  your procedure,                        call Dr. Cee at [phone]**

## 2017-11-20 NOTE — OP NOTE
[facility]  Urology Brief Operative Note    Pre-operative diagnosis: PROSTATE CANCER    Post-operative diagnosis * No post-op diagnosis entered *   Procedure: Prostate   Brachytherapy   Surgeon: Anselmo Cee MD, Marky Ryder MD   Assistants(s):    Anesthesia: General    Estimated blood loss: Minimal    Total IV fluids: (See anesthesia record)     Blood transfusion: No transfusion was given during surgery   Total urine output: (See anesthesia record)  Not measured   Drains: None   Specimens: None   Implants: None   Findings: Iodine 125---89   Complications: None   Condition: Stable   Comments: See dictated operative report for full details

## 2019-11-06 ENCOUNTER — HEALTH MAINTENANCE LETTER (OUTPATIENT)
Age: 64
End: 2019-11-06

## 2020-02-16 ENCOUNTER — HEALTH MAINTENANCE LETTER (OUTPATIENT)
Age: 65
End: 2020-02-16

## 2020-11-22 ENCOUNTER — HEALTH MAINTENANCE LETTER (OUTPATIENT)
Age: 65
End: 2020-11-22

## 2021-04-10 ENCOUNTER — HEALTH MAINTENANCE LETTER (OUTPATIENT)
Age: 66
End: 2021-04-10

## 2021-09-19 ENCOUNTER — HEALTH MAINTENANCE LETTER (OUTPATIENT)
Age: 66
End: 2021-09-19

## 2022-05-01 ENCOUNTER — HEALTH MAINTENANCE LETTER (OUTPATIENT)
Age: 67
End: 2022-05-01

## 2022-11-21 ENCOUNTER — HEALTH MAINTENANCE LETTER (OUTPATIENT)
Age: 67
End: 2022-11-21

## 2024-02-03 ENCOUNTER — HEALTH MAINTENANCE LETTER (OUTPATIENT)
Age: 69
End: 2024-02-03

## (undated) DEVICE — SYR BULB IRRIG 50ML LATEX FREE 0035280

## (undated) DEVICE — GRID RAD SEED 18GA 610-906

## (undated) DEVICE — CATH FOLEY COUDE 16FR 5ML LATEX

## (undated) DEVICE — PAD CHUX UNDERPAD 23X24" 7136

## (undated) DEVICE — RAD RX ISOVUE 300 (50ML) 61% IOPAMIDOL CHARGE PER ML

## (undated) DEVICE — GLOVE RADIATION RESISTANT SZ 8  95-396

## (undated) DEVICE — DRAPE SHEET REV FOLD 3/4 9349

## (undated) DEVICE — ESU HOLSTER PLASTIC DISP E2400

## (undated) DEVICE — SYR 50ML CATH TIP W/O NDL 309620

## (undated) DEVICE — PACK CYSTOSCOPY SBA15CYFSI

## (undated) DEVICE — GLOVE PROTEXIS W/NEU-THERA 8.0  2D73TE80

## (undated) DEVICE — BALLOON BRACHY THERAPY UA0059

## (undated) DEVICE — NDL PROSTATE SEEDING 18GA 20CM 500118200

## (undated) DEVICE — SYR 50ML LL W/O NDL 309653

## (undated) DEVICE — BASIN EMESIS STERILE  SSK9005A

## (undated) DEVICE — GLOVE RADIATION RESISTANT SZ 7.5  95-395

## (undated) RX ORDER — FENTANYL CITRATE 50 UG/ML
INJECTION, SOLUTION INTRAMUSCULAR; INTRAVENOUS
Status: DISPENSED
Start: 2017-11-20

## (undated) RX ORDER — ONDANSETRON 2 MG/ML
INJECTION INTRAMUSCULAR; INTRAVENOUS
Status: DISPENSED
Start: 2017-11-20

## (undated) RX ORDER — LIDOCAINE HYDROCHLORIDE 20 MG/ML
INJECTION, SOLUTION EPIDURAL; INFILTRATION; INTRACAUDAL; PERINEURAL
Status: DISPENSED
Start: 2017-11-20

## (undated) RX ORDER — CEFAZOLIN SODIUM 2 G/100ML
INJECTION, SOLUTION INTRAVENOUS
Status: DISPENSED
Start: 2017-11-20

## (undated) RX ORDER — HYDROCODONE BITARTRATE AND ACETAMINOPHEN 5; 325 MG/1; MG/1
TABLET ORAL
Status: DISPENSED
Start: 2017-11-20

## (undated) RX ORDER — PROPOFOL 10 MG/ML
INJECTION, EMULSION INTRAVENOUS
Status: DISPENSED
Start: 2017-11-20

## (undated) RX ORDER — DEXAMETHASONE SODIUM PHOSPHATE 4 MG/ML
INJECTION, SOLUTION INTRA-ARTICULAR; INTRALESIONAL; INTRAMUSCULAR; INTRAVENOUS; SOFT TISSUE
Status: DISPENSED
Start: 2017-11-20